# Patient Record
Sex: MALE | URBAN - METROPOLITAN AREA
[De-identification: names, ages, dates, MRNs, and addresses within clinical notes are randomized per-mention and may not be internally consistent; named-entity substitution may affect disease eponyms.]

---

## 2019-11-11 ENCOUNTER — TELEPHONE (OUTPATIENT)
Dept: GASTROENTEROLOGY | Age: 59
End: 2019-11-11

## 2023-10-10 PROBLEM — H52.13 MYOPIA OF BOTH EYES: Status: ACTIVE | Noted: 2023-10-10

## 2023-10-10 PROBLEM — E78.2 MIXED HYPERLIPIDEMIA: Status: ACTIVE | Noted: 2023-10-10

## 2023-10-10 PROBLEM — E11.319 DIABETIC RETINOPATHY (MULTI): Status: ACTIVE | Noted: 2023-10-10

## 2023-10-10 PROBLEM — E78.1 ABNORMALLY LOW HIGH DENSITY LIPOPROTEIN (HDL) CHOLESTEROL WITH HYPERTRIGLYCERIDEMIA: Status: ACTIVE | Noted: 2023-10-10

## 2023-10-10 PROBLEM — E11.9 DIABETES MELLITUS (MULTI): Status: ACTIVE | Noted: 2023-10-10

## 2023-10-10 PROBLEM — H51.8 INFERIOR OBLIQUE OVERACTION: Status: ACTIVE | Noted: 2023-10-10

## 2023-10-10 PROBLEM — H50.21 HYPERTROPIA OF RIGHT EYE: Status: ACTIVE | Noted: 2023-10-10

## 2023-10-10 PROBLEM — E66.9 OBESITY: Status: ACTIVE | Noted: 2023-10-10

## 2023-10-10 PROBLEM — G47.30 SLEEP APNEA: Status: ACTIVE | Noted: 2023-10-10

## 2023-10-10 PROBLEM — I25.10 CAD (CORONARY ARTERY DISEASE): Status: ACTIVE | Noted: 2023-10-10

## 2023-10-10 PROBLEM — Z95.5 STATUS POST INSERTION OF DRUG-ELUTING STENT INTO LEFT ANTERIOR DESCENDING ARTERY FOR CORONARY ARTERY DISEASE: Status: ACTIVE | Noted: 2023-10-10

## 2023-10-10 PROBLEM — Z96.1 PSEUDOPHAKIA OF LEFT EYE: Status: ACTIVE | Noted: 2023-10-10

## 2023-10-10 PROBLEM — I51.9 HEART DISEASE: Status: ACTIVE | Noted: 2023-10-10

## 2023-10-10 PROBLEM — E78.6 ABNORMALLY LOW HIGH DENSITY LIPOPROTEIN (HDL) CHOLESTEROL WITH HYPERTRIGLYCERIDEMIA: Status: ACTIVE | Noted: 2023-10-10

## 2023-10-10 PROBLEM — H35.372 EPIRETINAL MEMBRANE, LEFT EYE: Status: ACTIVE | Noted: 2023-10-10

## 2023-10-10 PROBLEM — I10 ESSENTIAL HYPERTENSION: Status: ACTIVE | Noted: 2023-10-10

## 2023-10-10 PROBLEM — K13.70 MOUTH LESION: Status: ACTIVE | Noted: 2023-10-10

## 2023-10-10 PROBLEM — R07.9 CHEST PAIN: Status: ACTIVE | Noted: 2023-10-10

## 2023-10-10 PROBLEM — H50.30 EXOTROPIA, INTERMITTENT: Status: ACTIVE | Noted: 2023-10-10

## 2023-10-10 PROBLEM — H52.203 ASTIGMATISM OF BOTH EYES: Status: ACTIVE | Noted: 2023-10-10

## 2023-10-10 RX ORDER — METOPROLOL SUCCINATE 50 MG/1
2 TABLET, EXTENDED RELEASE ORAL DAILY
COMMUNITY

## 2023-10-10 RX ORDER — LOSARTAN POTASSIUM 100 MG/1
1 TABLET ORAL DAILY
COMMUNITY
Start: 2018-03-15

## 2023-10-10 RX ORDER — METFORMIN HYDROCHLORIDE 1000 MG/1
1000 TABLET ORAL 2 TIMES DAILY
COMMUNITY
Start: 2018-09-24

## 2023-10-10 RX ORDER — ACETAMINOPHEN 500 MG
TABLET ORAL
COMMUNITY

## 2023-10-10 RX ORDER — NITROGLYCERIN 0.4 MG/1
0.4 TABLET SUBLINGUAL EVERY 5 MIN PRN
COMMUNITY
Start: 2021-03-11 | End: 2024-06-07 | Stop reason: SDUPTHER

## 2023-10-10 RX ORDER — NAPROXEN SODIUM 220 MG/1
81 TABLET, FILM COATED ORAL
COMMUNITY
Start: 2021-03-11

## 2023-10-10 RX ORDER — ATORVASTATIN CALCIUM 40 MG/1
1 TABLET, FILM COATED ORAL NIGHTLY
COMMUNITY

## 2023-10-10 RX ORDER — AMLODIPINE BESYLATE 10 MG/1
1 TABLET ORAL DAILY
COMMUNITY
Start: 2018-03-15

## 2023-10-10 RX ORDER — MULTIVITAMIN/IRON/FOLIC ACID 18MG-0.4MG
1 TABLET ORAL DAILY
COMMUNITY

## 2023-10-10 RX ORDER — HYDROCHLOROTHIAZIDE 25 MG/1
1 TABLET ORAL DAILY
COMMUNITY

## 2023-10-10 RX ORDER — FERROUS SULFATE 325(65) MG
1 TABLET ORAL 2 TIMES DAILY
COMMUNITY

## 2023-10-11 ENCOUNTER — ANCILLARY PROCEDURE (OUTPATIENT)
Dept: RADIOLOGY | Facility: CLINIC | Age: 63
End: 2023-10-11
Payer: COMMERCIAL

## 2023-10-11 ENCOUNTER — OFFICE VISIT (OUTPATIENT)
Dept: ORTHOPEDIC SURGERY | Facility: CLINIC | Age: 63
End: 2023-10-11
Payer: COMMERCIAL

## 2023-10-11 DIAGNOSIS — M25.561 RIGHT KNEE PAIN, UNSPECIFIED CHRONICITY: ICD-10-CM

## 2023-10-11 DIAGNOSIS — M25.561 RIGHT KNEE PAIN, UNSPECIFIED CHRONICITY: Primary | ICD-10-CM

## 2023-10-11 PROCEDURE — 2500000005 HC RX 250 GENERAL PHARMACY W/O HCPCS: Performed by: ORTHOPAEDIC SURGERY

## 2023-10-11 PROCEDURE — 20610 DRAIN/INJ JOINT/BURSA W/O US: CPT | Performed by: ORTHOPAEDIC SURGERY

## 2023-10-11 PROCEDURE — 99204 OFFICE O/P NEW MOD 45 MIN: CPT | Performed by: ORTHOPAEDIC SURGERY

## 2023-10-11 PROCEDURE — 73564 X-RAY EXAM KNEE 4 OR MORE: CPT | Mod: RIGHT SIDE | Performed by: ORTHOPAEDIC SURGERY

## 2023-10-11 PROCEDURE — 99214 OFFICE O/P EST MOD 30 MIN: CPT | Mod: 25 | Performed by: ORTHOPAEDIC SURGERY

## 2023-10-11 PROCEDURE — 4010F ACE/ARB THERAPY RXD/TAKEN: CPT | Performed by: ORTHOPAEDIC SURGERY

## 2023-10-11 PROCEDURE — 20610 DRAIN/INJ JOINT/BURSA W/O US: CPT | Mod: RT | Performed by: ORTHOPAEDIC SURGERY

## 2023-10-11 PROCEDURE — 73564 X-RAY EXAM KNEE 4 OR MORE: CPT | Mod: RT,FY

## 2023-10-11 PROCEDURE — 2500000004 HC RX 250 GENERAL PHARMACY W/ HCPCS (ALT 636 FOR OP/ED): Performed by: ORTHOPAEDIC SURGERY

## 2023-10-11 RX ORDER — LIDOCAINE HYDROCHLORIDE 10 MG/ML
4 INJECTION INFILTRATION; PERINEURAL ONCE
Status: COMPLETED | OUTPATIENT
Start: 2023-10-11 | End: 2023-10-11

## 2023-10-11 RX ORDER — BETAMETHASONE SODIUM PHOSPHATE AND BETAMETHASONE ACETATE 3; 3 MG/ML; MG/ML
12 INJECTION, SUSPENSION INTRA-ARTICULAR; INTRALESIONAL; INTRAMUSCULAR; SOFT TISSUE ONCE
Status: COMPLETED | OUTPATIENT
Start: 2023-10-11 | End: 2023-10-11

## 2023-10-11 RX ADMIN — LIDOCAINE HYDROCHLORIDE 40 MG: 10 INJECTION, SOLUTION INFILTRATION; PERINEURAL at 16:05

## 2023-10-11 RX ADMIN — BETAMETHASONE SODIUM PHOSPHATE AND BETAMETHASONE ACETATE 12 MG: 3; 3 INJECTION, SUSPENSION INTRA-ARTICULAR; INTRALESIONAL; INTRAMUSCULAR at 16:05

## 2023-10-12 NOTE — PROGRESS NOTES
New patient to me 63-year-old presents complaining of pain in his right knee states has been having trouble with his right knee for quite some time he went on a road trip vacation over the summer and did a lot of walking does not recall any specific episode or injury but started to give him more trouble afterwards he has difficulty getting up and down from seated position pain is mostly along the medial aspect of the right knee.    Location of pain: Right knee medial aspect  Quality of pain: Intermittent pain over the years but chronically persistent over the last 3 to 4 months  Modifying factors: Worse when he gets up and down from seated position or walks or goes up and down stairs  Associated signs and symptoms: Some popping clicking grinding some swelling no numbness or tingling no fevers or chills  Previous treatment: He has tried aspirin without much improvement he does take Motrin once a day and that seems to help with things he thinks he may have had a cortisone injection years ago        The patient's past medical history, family history, social history, and review of systems were documented on the patient's medical intake form.  The medical intake form was reviewed and scanned into the electronic medical record for future use.  History is otherwise negative except as stated in the HPI.    Physical exam    General: Alert and oriented to place, person, and time.  No acute distress and breathing comfortably; pleasant and cooperative with the examination.  HEENT: Head is normocephalic and atraumatic.  Neck: Supple, no visible swelling.  Cardiovascular: Good perfusion to the affected extremity.  Lungs: No audible wheezing or labored breathing.  Abdomen: Nondistended  HEME/Lymph : No visible abnormalities bilateral lower extremity    Extremity:  The affected knee was examined and inspected and was tender to touch along the medial aspect.  The patient has catching/locking and occasional mechanical symptoms.   The skin was intact without breakdown or open wounds.  There was a mild Johanna exam seen with mild evidence of instability and weakness in the collateral ligaments with laxity to varus valgus stress and in the anterior posterior plane.  There was a negative Lachman's test, pivot shift test, and posterior drawer sign.  There was no foot drop, numbness or tingling.  Sensation, reflexes, and pulses in the foot and ankle were present.  There was an effusion but range of motion was good and straight leg raise testing was normal.   The patient had the ability to bear weight but with discomfort.  The patient's gait was antalgic secondary to the discomfort. Knee range of motion was 0-115  The contralateral joint was inspected, examined, palpated, and evaluated.  It was found to be normal with regards to neurovascular status.  There was no area of tenderness to palpation in the limb on the contralateral side had good range of motion.  There is no evidence of instability or neurovascular change.    Diagnostics:      XR knee right 4+ views    Result Date: 10/11/2023  Interpreted By:  Triston Campa, STUDY: XR KNEE RIGHT 4+ VIEWS;  ;  10/11/2023 2:48 pm   INDICATION: Signs/Symptoms:pain.   ACCESSION NUMBER(S): [id number]   ORDERING CLINICIAN: TRISTON CAMPA   FINDINGS: Right knee weightbearing four views. Significant knee arthritis with bone-on-bone articulation medial joint space varus deformity moderate osteophyte formation no evidence of fracture dislocation or other bony abnormality     Signed by: Triston Campa 10/11/2023 3:59 PM Dictation workstation:   [id number]         Procedures  Before aspiration/injection, the risks  of this procedure including but not limited to;  infection, local skin irritation, skin atrophy, calcification, continued pain or discomfort, elevated blood sugar, burning, failure to relieve pain, possible late infection were all discussed with the patient.  The patient verbalized  understanding and consented to the procedure.   After informed consent was provided, patient identification was confirmed, and allergies were verified, the patient was appropriately positioned. The site was marked and time-out performed.  The injection site was prepped in the usual sterile manner to provide a sterile environment. The skin was anesthetized with ethyl chloride spray. The aspiration/injection was performed with standard technique. The needle was withdrawn and the puncture site was secured with a Band-Aid. The patient tolerated the procedure well without complication.   Post-procedure discomfort can be alleviated with additional medication, ice, elevation, and rest over the first 24 hours as recommended.  The injection was right knee 2 cc celestone 4cc lidocaine    Assessment:  Right knee arthritis    Treatment plan:  1.  The natural history of the condition and its associated treatment alternatives including surgical and nonsurgical options were discussed with the patient at length.  2.  Cortisone injection right knee performed today without complication.  Patient understands the cortisone may provide temporary relief how much it helps her how long it lasts is unpredictable.  Cortisone can be repeated after 3 months if symptoms return.  3.  Follow-up 1 month discussed results.  4.  All of the patient's questions were answered.    This note was prepared using voice recognition software.  The details of this note are correct and have been reviewed, and corrected to the best of my ability.  Some grammatical areas may persist related to the Dragon software    Julius Campa MD  Senior Attending Physician  [facility]  [facility] [place]    [phone]

## 2023-11-15 ENCOUNTER — APPOINTMENT (OUTPATIENT)
Dept: ORTHOPEDIC SURGERY | Facility: CLINIC | Age: 63
End: 2023-11-15
Payer: COMMERCIAL

## 2023-11-16 ENCOUNTER — PREP FOR PROCEDURE (OUTPATIENT)
Dept: OPHTHALMOLOGY | Facility: HOSPITAL | Age: 63
End: 2023-11-16
Payer: COMMERCIAL

## 2023-11-16 DIAGNOSIS — H50.10 EXOTROPIA OF BOTH EYES: Primary | ICD-10-CM

## 2023-11-16 DIAGNOSIS — H51.8 INFERIOR OBLIQUE OVERACTION: ICD-10-CM

## 2024-01-10 ENCOUNTER — TELEPHONE (OUTPATIENT)
Dept: OPHTHALMOLOGY | Facility: CLINIC | Age: 64
End: 2024-01-10
Payer: COMMERCIAL

## 2024-01-10 NOTE — TELEPHONE ENCOUNTER
Spoke with patient on 1/10/24 @ 9:43 am about scheduling surgery at [facility]. Patient stated that he wanted surgery done before the end of 2023 due to insurance change. Patient states that he will call the office back if he changes his mind.

## 2024-01-31 ENCOUNTER — OFFICE VISIT (OUTPATIENT)
Dept: CARDIOLOGY | Facility: CLINIC | Age: 64
End: 2024-01-31
Payer: MEDICAID

## 2024-01-31 VITALS
BODY MASS INDEX: 53.78 KG/M2 | HEART RATE: 90 BPM | SYSTOLIC BLOOD PRESSURE: 128 MMHG | DIASTOLIC BLOOD PRESSURE: 84 MMHG | WEIGHT: 315 LBS | HEIGHT: 64 IN

## 2024-01-31 DIAGNOSIS — Z95.5 STATUS POST INSERTION OF DRUG-ELUTING STENT INTO LEFT ANTERIOR DESCENDING ARTERY FOR CORONARY ARTERY DISEASE: ICD-10-CM

## 2024-01-31 DIAGNOSIS — E78.2 MIXED HYPERLIPIDEMIA: ICD-10-CM

## 2024-01-31 DIAGNOSIS — E08.00 DIABETES MELLITUS DUE TO UNDERLYING CONDITION WITH HYPEROSMOLARITY WITHOUT COMA, WITHOUT LONG-TERM CURRENT USE OF INSULIN (MULTI): ICD-10-CM

## 2024-01-31 DIAGNOSIS — E66.01 MORBID OBESITY WITH BODY MASS INDEX (BMI) OF 50.0 TO 59.9 IN ADULT (MULTI): ICD-10-CM

## 2024-01-31 DIAGNOSIS — I10 ESSENTIAL HYPERTENSION: ICD-10-CM

## 2024-01-31 DIAGNOSIS — Z87.891 FORMER SMOKER: ICD-10-CM

## 2024-01-31 DIAGNOSIS — I25.10 CORONARY ARTERY DISEASE INVOLVING NATIVE CORONARY ARTERY OF NATIVE HEART WITHOUT ANGINA PECTORIS: ICD-10-CM

## 2024-01-31 DIAGNOSIS — G47.30 SLEEP APNEA, UNSPECIFIED TYPE: ICD-10-CM

## 2024-01-31 PROCEDURE — 99214 OFFICE O/P EST MOD 30 MIN: CPT | Performed by: INTERNAL MEDICINE

## 2024-01-31 PROCEDURE — 1159F MED LIST DOCD IN RCRD: CPT | Performed by: INTERNAL MEDICINE

## 2024-01-31 PROCEDURE — 4010F ACE/ARB THERAPY RXD/TAKEN: CPT | Performed by: INTERNAL MEDICINE

## 2024-01-31 PROCEDURE — 3074F SYST BP LT 130 MM HG: CPT | Performed by: INTERNAL MEDICINE

## 2024-01-31 PROCEDURE — 3008F BODY MASS INDEX DOCD: CPT | Performed by: INTERNAL MEDICINE

## 2024-01-31 PROCEDURE — 3079F DIAST BP 80-89 MM HG: CPT | Performed by: INTERNAL MEDICINE

## 2024-01-31 PROCEDURE — 1036F TOBACCO NON-USER: CPT | Performed by: INTERNAL MEDICINE

## 2024-01-31 RX ORDER — GLUCOSAM/CHONDRO/HERB 149/HYAL 750-100 MG
1000 TABLET ORAL DAILY
Qty: 1 CAPSULE | Refills: 0 | OUTPATIENT
Start: 2024-01-31

## 2024-01-31 ASSESSMENT — ENCOUNTER SYMPTOMS
NEUROLOGICAL NEGATIVE: 1
CARDIOVASCULAR NEGATIVE: 1
RESPIRATORY NEGATIVE: 1
CONSTITUTIONAL NEGATIVE: 1

## 2024-01-31 NOTE — PROGRESS NOTES
CARDIOLOGY OFFICE VISIT      CHIEF COMPLAINT  Chief Complaint   Patient presents with    Patient here for a 1 year follow up     Last OV 11/9/22        HISTORY OF PRESENT ILLNESS  Johny Hess is a 64 y.o. year old male patient with a history of coronary artery disease s/p LAD stent in March 2021, obstructive sleep apnea on BiPAP therapy, hypertension and morbid obesity will returns for follow-up.  His LV function was normal.  He has been doing well denies any recent chest pain or significant shortness of breath with his day-to-day activities.  He also denies orthopnea proximal nocturnal dyspnea.  Recent lipids from October 2023 shows cholesterol is 118, triglyceride is 153, LDL is 49 and is HDL is 38.  Transaminases are within normal limits    ASSESSMENT AND PLAN  1.  CAD s/p LAD stent as noted above with no recurrent chest pain, continue with lifelong aspirin.  2.  Hypertension: Blood pressure is well-controlled current medications which are continued.  3.  Dyslipidemia: With acceptable lipid profile as noted above, continue with lipid-lowering therapy.  4.  Morbid obesity: Patient is encouraged to lose weight and exercise.    Problem List Items Addressed This Visit       CAD (coronary artery disease)    Diabetes mellitus (CMS/HCC)    Essential hypertension    Mixed hyperlipidemia    Sleep apnea    Status post insertion of drug-eluting stent into left anterior descending artery for coronary artery disease    Morbid obesity with body mass index (BMI) of 50.0 to 59.9 in adult (CMS/HCC)    Former smoker       Recent Cardiovascular Testing:    Echo-  Stress-  Cath-  Carotid Ultrasound-    Past Medical History  Past Medical History:   Diagnosis Date    Coronary artery disease     Hyperlipidemia     Hypertension     Personal history of other endocrine, nutritional and metabolic disease     History of morbid obesity    Sleep apnea        Social History  Social History     Tobacco Use    Smoking status: Former      Types: Cigarettes     Quit date:      Years since quittin.1    Smokeless tobacco: Never   Substance Use Topics    Alcohol use: Not Currently    Drug use: Never       Family History     Family History   Problem Relation Name Age of Onset    Other (cardiac pacemaker) Mother      Diabetes Mother      Hypertension Mother      Thyroid disease Mother      Other (CABG) Mother      Aneurysm Father      Hypertension Father      Multiple sclerosis Sister          Allergies:  Allergies   Allergen Reactions    Fluticasone Propionate Unknown        Outpatient Medications:  Current Outpatient Medications   Medication Instructions    amLODIPine (Norvasc) 10 mg tablet 1 tablet, oral, Daily    aspirin 81 mg, oral, Daily RT    atorvastatin (Lipitor) 40 mg tablet 1 tablet, oral, Nightly    b complex 0.4 mg tablet 1 tablet, oral, Daily    cholecalciferol (Vitamin D3) 50 mcg (2,000 unit) capsule as directed    docosahexaenoic acid/epa (FISH OIL ORAL) Use as directed on bottle    empagliflozin (Jardiance) 25 mg 1 tablet, oral, Daily    ferrous sulfate 325 (65 Fe) MG tablet 1 tablet, oral, 2 times daily    hydroCHLOROthiazide (HYDRODiuril) 25 mg tablet 1 tablet, oral, Daily    losartan (Cozaar) 100 mg tablet 1 tablet, oral, Daily    metFORMIN (GLUCOPHAGE) 1,000 mg, oral, 2 times daily    metoprolol succinate XL (Toprol-XL) 50 mg 24 hr tablet 2 tablets, oral, Daily    nitroglycerin (NITROSTAT) 0.4 mg, sublingual, Every 5 min PRN, UP TO 3 DOSES AS NEEDED FOR CHEST PAIN.<BR>    ubidecarenone (COENZYME Q10, BULK, MISC) TAKE AS DIRECTED.        Recent Lab Results:    CBC:   Lab Results   Component Value Date    WBC 8.6 2021    RBC 4.46 (L) 2021    HGB 13.0 (L) 2021    HCT 41.1 2021     2021        CMP:    Lab Results   Component Value Date     (L) 2021    K 5.0 2021    CL 99 2021    CO2 22 2021    BUN 16 2021    CREATININE 0.92 2021    GLUCOSE 131 (H)  "03/11/2021    CALCIUM 9.6 03/11/2021       Magnesium:    No results found for: \"MG\"    Lipid Profile:    Lab Results   Component Value Date    TRIG 181 (H) 03/11/2021    HDL 31.0 (A) 03/11/2021       TSH:    No results found for: \"TSH\"    BNP:   No results found for: \"BNP\"     PT/INR:    Lab Results   Component Value Date    PROTIME 12.4 03/10/2021    INR 1.1 03/10/2021       HgBA1c:    Lab Results   Component Value Date    HGBA1C 7.2 (H) 10/03/2023       BMP:  Lab Results   Component Value Date     (L) 03/11/2021     03/10/2021    K 5.0 03/11/2021    K 3.8 03/10/2021    CL 99 03/11/2021    CL 99 03/10/2021    CO2 22 03/11/2021    CO2 26 03/10/2021    BUN 16 03/11/2021    BUN 18 03/10/2021    CREATININE 0.92 03/11/2021    CREATININE 0.81 03/10/2021       CBC:  Lab Results   Component Value Date    WBC 8.6 03/11/2021    WBC 12.6 (H) 03/10/2021    RBC 4.46 (L) 03/11/2021    RBC 5.06 03/10/2021    HGB 13.0 (L) 03/11/2021    HGB 14.9 03/10/2021    HCT 41.1 03/11/2021    HCT 46.0 03/10/2021    MCV 92 03/11/2021    MCV 91 03/10/2021    MCHC 31.6 (L) 03/11/2021    MCHC 32.4 03/10/2021    RDW 13.2 03/11/2021    RDW 13.2 03/10/2021     03/11/2021     03/10/2021       Cardiac Enzymes:    No results found for: \"TROPHS\"    Hepatic Function Panel:    No results found for: \"ALKPHOS\", \"ALT\", \"AST\", \"PROT\", \"BILITOT\", \"BILIDIR\"      REVIEW OF SYSTEMS  Review of Systems   Constitutional: Negative.   Cardiovascular: Negative.    Respiratory: Negative.     Neurological: Negative.    All other systems reviewed and are negative.      VITALS  Vitals:    01/31/24 0959   BP: 128/84   Pulse: 90     Wt Readings from Last 4 Encounters:   01/31/24 149 kg (329 lb 4.8 oz)   11/09/22 (!) 151 kg (332 lb 6.4 oz)   05/04/22 (!) 156 kg (344 lb 6 oz)   01/05/22 (!) 159 kg (351 lb 4 oz)       PHYSICAL EXAM  Constitutional:       Appearance: Healthy appearance.   Eyes:      Pupils: Pupils are equal, round, and reactive to " light.   Pulmonary:      Effort: Pulmonary effort is normal.      Breath sounds: Normal breath sounds.   Cardiovascular:      PMI at left midclavicular line. Normal rate. Regular rhythm.      Murmurs: There is no murmur.      No gallop.  No click. No rub.   Pulses:     Intact distal pulses.   Musculoskeletal: Normal range of motion.      Cervical back: Normal range of motion. Skin:     General: Skin is warm and dry.   Neurological:      General: No focal deficit present.      Mental Status: Alert and oriented to person, place and time.

## 2024-06-07 DIAGNOSIS — I25.10 CORONARY ARTERY DISEASE INVOLVING NATIVE CORONARY ARTERY OF NATIVE HEART WITHOUT ANGINA PECTORIS: ICD-10-CM

## 2024-06-07 RX ORDER — NITROGLYCERIN 0.4 MG/1
0.4 TABLET SUBLINGUAL EVERY 5 MIN PRN
Qty: 25 TABLET | Refills: 1 | Status: SHIPPED | OUTPATIENT
Start: 2024-06-07 | End: 2024-06-11

## 2024-06-07 NOTE — TELEPHONE ENCOUNTER
----- Message from Johny Hess sent at 6/7/2024 12:14 PM EDT -----  Regarding: New Prescription  Contact: [phone]  Patricia Davis,    I have an outdated prescription for the Nitrostat pills that I carry with me and need a new prescription sent in.     Can you please send one to my new Pharmacy.  [facility] in [place], [place].     Unless it is no longer required or safer to have available.    Thanks   Chin Hess  [phone]    Please advise

## 2024-06-11 DIAGNOSIS — I25.10 CORONARY ARTERY DISEASE INVOLVING NATIVE CORONARY ARTERY OF NATIVE HEART WITHOUT ANGINA PECTORIS: ICD-10-CM

## 2024-06-11 RX ORDER — NITROGLYCERIN 0.4 MG/1
TABLET SUBLINGUAL
Qty: 25 TABLET | Refills: 1 | Status: SHIPPED | OUTPATIENT
Start: 2024-06-11

## 2025-01-29 ENCOUNTER — APPOINTMENT (OUTPATIENT)
Dept: CARDIOLOGY | Facility: CLINIC | Age: 65
End: 2025-01-29
Payer: COMMERCIAL

## 2025-01-29 VITALS
BODY MASS INDEX: 53.78 KG/M2 | HEIGHT: 64 IN | HEART RATE: 90 BPM | SYSTOLIC BLOOD PRESSURE: 128 MMHG | DIASTOLIC BLOOD PRESSURE: 80 MMHG | WEIGHT: 315 LBS

## 2025-01-29 DIAGNOSIS — Z87.891 FORMER SMOKER: ICD-10-CM

## 2025-01-29 DIAGNOSIS — I25.10 CORONARY ARTERY DISEASE INVOLVING NATIVE CORONARY ARTERY OF NATIVE HEART WITHOUT ANGINA PECTORIS: ICD-10-CM

## 2025-01-29 DIAGNOSIS — Z95.5 STATUS POST INSERTION OF DRUG-ELUTING STENT INTO LEFT ANTERIOR DESCENDING ARTERY FOR CORONARY ARTERY DISEASE: ICD-10-CM

## 2025-01-29 DIAGNOSIS — E66.01 MORBID OBESITY WITH BODY MASS INDEX (BMI) OF 50.0 TO 59.9 IN ADULT (MULTI): ICD-10-CM

## 2025-01-29 DIAGNOSIS — I10 ESSENTIAL HYPERTENSION: ICD-10-CM

## 2025-01-29 DIAGNOSIS — E78.2 MIXED HYPERLIPIDEMIA: ICD-10-CM

## 2025-01-29 DIAGNOSIS — G47.30 SLEEP APNEA, UNSPECIFIED TYPE: ICD-10-CM

## 2025-01-29 PROCEDURE — 3008F BODY MASS INDEX DOCD: CPT | Performed by: INTERNAL MEDICINE

## 2025-01-29 PROCEDURE — 3074F SYST BP LT 130 MM HG: CPT | Performed by: INTERNAL MEDICINE

## 2025-01-29 PROCEDURE — 1159F MED LIST DOCD IN RCRD: CPT | Performed by: INTERNAL MEDICINE

## 2025-01-29 PROCEDURE — 3079F DIAST BP 80-89 MM HG: CPT | Performed by: INTERNAL MEDICINE

## 2025-01-29 PROCEDURE — 99214 OFFICE O/P EST MOD 30 MIN: CPT | Performed by: INTERNAL MEDICINE

## 2025-01-29 PROCEDURE — 4010F ACE/ARB THERAPY RXD/TAKEN: CPT | Performed by: INTERNAL MEDICINE

## 2025-01-29 PROCEDURE — 1036F TOBACCO NON-USER: CPT | Performed by: INTERNAL MEDICINE

## 2025-01-29 RX ORDER — NITROGLYCERIN 0.4 MG/1
0.4 TABLET SUBLINGUAL EVERY 5 MIN PRN
Qty: 25 TABLET | Refills: 1 | Status: SHIPPED | OUTPATIENT
Start: 2025-01-29

## 2025-01-29 RX ORDER — OMEPRAZOLE 20 MG/1
1 CAPSULE, DELAYED RELEASE ORAL DAILY PRN
COMMUNITY
Start: 2024-07-24

## 2025-01-29 RX ORDER — METFORMIN HYDROCHLORIDE 500 MG/1
500 TABLET, EXTENDED RELEASE ORAL
COMMUNITY
Start: 2025-01-15

## 2025-01-29 ASSESSMENT — ENCOUNTER SYMPTOMS
CARDIOVASCULAR NEGATIVE: 1
NEUROLOGICAL NEGATIVE: 1
CONSTITUTIONAL NEGATIVE: 1
RESPIRATORY NEGATIVE: 1

## 2025-01-29 NOTE — PROGRESS NOTES
CARDIOLOGY OFFICE VISIT      CHIEF COMPLAINT  Chief Complaint   Patient presents with    Follow-up     1 year        HISTORY OF PRESENT ILLNESS  Johny Hess is a 65 y.o. year old male patient with a history of coronary artery disease s/p LAD stent in March 2021, obstructive sleep apnea on BiPAP therapy, hypertension and morbid obesity will returns for follow-up.  His LV function was normal.  He has been doing well denies any recent chest pain or significant shortness of breath with his day-to-day activities.  He also denies orthopnea proximal nocturnal dyspnea.    He had AAA screening in April 2024 that did not show any significant aneurysm.  The proximal aorta measures 2.3 cm at its widest dimension.  He was diagnosed with mild liver cirrhosis for which she is following with GI specialist.  He has been on Ozempic and is managed to lose some weight since his last visit.  Labs from October 2024 shows cholesterol is 169, HDL is 35, LDL is 47 with a triglyceride of 169    ASSESSMENT AND PLAN  1.  CAD s/p LAD stent as noted above with no recurrent chest pain, continue with lifelong aspirin.  2.  Hypertension: Blood pressure is well-controlled current medications which are continued.  3.  Dyslipidemia: With acceptable lipid profile as noted above, continue with lipid-lowering therapy.  4.  Morbid obesity: Patient is encouraged to continue to lose weight and exercise.    Problem List Items Addressed This Visit          Cardiac and Vasculature    CAD (coronary artery disease)    Relevant Medications    nitroglycerin (Nitrostat) 0.4 mg SL tablet    Essential hypertension    Mixed hyperlipidemia    Status post insertion of drug-eluting stent into left anterior descending artery for coronary artery disease       Endocrine/Metabolic    Morbid obesity with body mass index (BMI) of 50.0 to 59.9 in adult (Multi)       Sleep    Sleep apnea       Tobacco    Former smoker       Recent Cardiovascular  Testing:    Echo-  Stress-  Cath-  Carotid Ultrasound-    Past Medical History  Past Medical History:   Diagnosis Date    Coronary artery disease     Hyperlipidemia     Hypertension     Personal history of other endocrine, nutritional and metabolic disease     History of morbid obesity    Sleep apnea        Social History  Social History     Tobacco Use    Smoking status: Former     Current packs/day: 0.00     Types: Cigarettes     Quit date:      Years since quittin.1    Smokeless tobacco: Never   Substance Use Topics    Alcohol use: Not Currently    Drug use: Never       Family History     Family History   Problem Relation Name Age of Onset    Other (cardiac pacemaker) Mother      Diabetes Mother      Hypertension Mother      Thyroid disease Mother      Other (CABG) Mother      Aneurysm Father      Hypertension Father      Multiple sclerosis Sister          Allergies:  Allergies   Allergen Reactions    Fluticasone Propionate Unknown        Outpatient Medications:  Current Outpatient Medications   Medication Instructions    amLODIPine (Norvasc) 10 mg tablet 1 tablet, Daily    aspirin 81 mg, Daily RT    atorvastatin (Lipitor) 40 mg tablet 1 tablet, Nightly    b complex 0.4 mg tablet 1 tablet, Daily    cholecalciferol (Vitamin D3) 50 mcg (2,000 unit) capsule as directed    empagliflozin (Jardiance) 25 mg 1 tablet, Daily    ferrous sulfate 325 (65 Fe) MG tablet 1 tablet, 2 times daily    hydroCHLOROthiazide (HYDRODiuril) 25 mg tablet 1 tablet, Daily    losartan (Cozaar) 100 mg tablet 1 tablet, Daily    metFORMIN XR (GLUCOPHAGE-XR) 500 mg, Daily with breakfast    metoprolol succinate XL (Toprol-XL) 50 mg 24 hr tablet 2 tablets, Daily    nitroglycerin (NITROSTAT) 0.4 mg, sublingual, Every 5 min PRN    omega 3-dha-epa-fish oil (Fish OiL) 1,000 mg (120 mg-180 mg) capsule 1,000 mg, oral, Daily    omeprazole (PriLOSEC) 20 mg DR capsule 1 capsule, Daily PRN    semaglutide (OZEMPIC) 1 mg, Weekly    ubidecarenone  "(COENZYME Q10, BULK, MISC) TAKE AS DIRECTED.        Recent Lab Results:    CBC:   Lab Results   Component Value Date    WBC 8.6 03/11/2021    RBC 4.46 (L) 03/11/2021    HGB 13.0 (L) 03/11/2021    HCT 41.1 03/11/2021     03/11/2021        CMP:    Lab Results   Component Value Date     (L) 03/11/2021    K 5.0 03/11/2021    CL 99 03/11/2021    CO2 22 03/11/2021    BUN 16 03/11/2021    CREATININE 0.92 03/11/2021    GLUCOSE 131 (H) 03/11/2021    CALCIUM 9.6 03/11/2021       Magnesium:    No results found for: \"MG\"    Lipid Profile:    Lab Results   Component Value Date    TRIG 181 (H) 03/11/2021    HDL 31.0 (A) 03/11/2021       TSH:    No results found for: \"TSH\"    BNP:   No results found for: \"BNP\"     PT/INR:    Lab Results   Component Value Date    PROTIME 12.4 03/10/2021    INR 1.1 03/10/2021       HgBA1c:    Lab Results   Component Value Date    HGBA1C 7.1 (H) 01/02/2025       BMP:  Lab Results   Component Value Date     (L) 03/11/2021     03/10/2021    K 5.0 03/11/2021    K 3.8 03/10/2021    CL 99 03/11/2021    CL 99 03/10/2021    CO2 22 03/11/2021    CO2 26 03/10/2021    BUN 16 03/11/2021    BUN 18 03/10/2021    CREATININE 0.92 03/11/2021    CREATININE 0.81 03/10/2021       CBC:  Lab Results   Component Value Date    WBC 8.6 03/11/2021    WBC 12.6 (H) 03/10/2021    RBC 4.46 (L) 03/11/2021    RBC 5.06 03/10/2021    HGB 13.0 (L) 03/11/2021    HGB 14.9 03/10/2021    HCT 41.1 03/11/2021    HCT 46.0 03/10/2021    MCV 92 03/11/2021    MCV 91 03/10/2021    MCHC 31.6 (L) 03/11/2021    MCHC 32.4 03/10/2021    RDW 13.2 03/11/2021    RDW 13.2 03/10/2021     03/11/2021     03/10/2021       Cardiac Enzymes:    No results found for: \"TROPHS\"    Hepatic Function Panel:    No results found for: \"ALKPHOS\", \"ALT\", \"AST\", \"PROT\", \"BILITOT\", \"BILIDIR\"      REVIEW OF SYSTEMS  Review of Systems   Constitutional: Negative.   Cardiovascular: Negative.    Respiratory: Negative.     Neurological: " Negative.    All other systems reviewed and are negative.      VITALS  Vitals:    01/29/25 1015   BP: 128/80   Pulse: 90     Wt Readings from Last 4 Encounters:   01/29/25 147 kg (323 lb 6.4 oz)   01/31/24 149 kg (329 lb 4.8 oz)   11/09/22 (!) 151 kg (332 lb 6.4 oz)   05/04/22 (!) 156 kg (344 lb 6 oz)       PHYSICAL EXAM  Constitutional:       Appearance: Healthy appearance.   Eyes:      Pupils: Pupils are equal, round, and reactive to light.   Pulmonary:      Effort: Pulmonary effort is normal.      Breath sounds: Normal breath sounds.   Cardiovascular:      PMI at left midclavicular line. Normal rate. Regular rhythm.      Murmurs: There is no murmur.      No gallop.  No click. No rub.   Pulses:     Intact distal pulses.   Musculoskeletal: Normal range of motion.      Cervical back: Normal range of motion. Skin:     General: Skin is warm and dry.   Neurological:      General: No focal deficit present.      Mental Status: Alert and oriented to person, place and time.

## 2025-01-29 NOTE — PATIENT INSTRUCTIONS
Continue same medications and treatments.   Patient educated on proper medication use.   Patient educated on risk factor modification.   Please bring any lab results from other providers / physicians to your next appointment.     Please bring all medicines, vitamins, and herbal supplements with you when you come to the office.     Prescriptions will not be filled unless you are compliant with your follow up appointments or have a follow up appointment scheduled as per instruction of your physician. Refills should be requested at the time of your visit.  1  year visit

## 2025-07-31 ENCOUNTER — PATIENT MESSAGE (OUTPATIENT)
Dept: CARDIOLOGY | Facility: CLINIC | Age: 65
End: 2025-07-31
Payer: COMMERCIAL

## 2025-07-31 DIAGNOSIS — I25.10 CORONARY ARTERY DISEASE INVOLVING NATIVE CORONARY ARTERY OF NATIVE HEART WITHOUT ANGINA PECTORIS: ICD-10-CM

## 2025-08-01 RX ORDER — NITROGLYCERIN 0.4 MG/1
0.4 TABLET SUBLINGUAL EVERY 5 MIN PRN
Qty: 25 TABLET | Refills: 5 | Status: SHIPPED | OUTPATIENT
Start: 2025-08-01

## 2025-08-01 NOTE — TELEPHONE ENCOUNTER
Received request for prescription refills for patient.   Patient follows with Dr. Ryan MD     Request is for nitroglycerin   Is patient currently on medication Yes    Last OV 1/29/25  Next OV 1/28/26    Pended for signing and sent to provider

## 2026-01-28 ENCOUNTER — APPOINTMENT (OUTPATIENT)
Dept: CARDIOLOGY | Facility: CLINIC | Age: 66
End: 2026-01-28
Payer: COMMERCIAL